# Patient Record
(demographics unavailable — no encounter records)

---

## 2024-12-12 NOTE — ASSESSMENT
[FreeTextEntry1] : Palpitations  18% PACs on holter  metoprolol contraindication in setting uncontrolled asthma  diltiazem was trialed by PCP but pt c/o symptomatic bradycardia  TTE with preserved LV function. discussed avoiding triggers- will repeat monitor in 1 year   HTN  controlled on amlodipine 5mg   Dyslipidemia  Pre-diabetes    discussed diet changes   will f/u 6 months

## 2024-12-12 NOTE — HISTORY OF PRESENT ILLNESS
[FreeTextEntry1] : Ms. Scruggs is a 66 yo female with a hx dyslipidemia, hypertension, asthma, prediabetes presenting for a routine follow up.   Initially presented due to frequent palpitations described as skipped beats.  Echo showed LVEF 63%, mild MR, mild to mod TR  Exercise stress negative for ischemia  Monitor showed frequent PACs 18.6% She was started on diltiazem (instead of metoprolol in setting asthma) but c/o symptomatic bradycardia and stopped medication   Surgical hx: hysterectomy, left shoulder repair  Tobacco use: never  Alcohol use: none  Drug use: none  Caffeine: 1 cup coffee daily  Ob hx: 3 children, 3 pregnancies (1 miscarriage, 1 twin) preeclampsia with severe features  Family hx: father MI age 40s. paternal grandfather  MI 49 Exercise: walking

## 2024-12-12 NOTE — REVIEW OF SYSTEMS
[Palpitations] : palpitations [Negative] : Heme/Lymph [SOB] : no shortness of breath [Chest Discomfort] : no chest discomfort [Lower Ext Edema] : no extremity edema [Orthopnea] : no orthopnea [PND] : no PND [Syncope] : no syncope

## 2024-12-12 NOTE — CARDIOLOGY SUMMARY
[de-identified] : Exercise stress 8/7/2024 Conclusions: 1. The ECG is negative for ischemia. 2. The patient underwent stress testing using the standard [facility] protocol. _ The patient exercised for 6 min 0 sec. _ The test was stopped due to target heart rate achieved, maximum exertion effort and patient request. _ The peak heart rate was 153 bpm; 99 % of predicted maximal heart rate for this patient. _ The patient achieved 7 METS which is consistent with average exercise capacity. 3. Stress electrocardiogram: No ischemic ST segment changes. 4. Normal heart rate response. 5. Normal blood pressure response. 6. Arrhythmias: Frequent PAC's. [de-identified] : 6/27/2024 CONCLUSIONS:   1. Left ventricular systolic function is normal with an ejection fraction of 63 % by Tolliver's method of disks.  2. Normal right ventricular cavity size and normal right ventricular systolic function.  3. Mild mitral regurgitation.  4. Mild to moderate tricuspid regurgitation.

## 2024-12-12 NOTE — HISTORY OF PRESENT ILLNESS
[FreeTextEntry1] : Ms. Scruggs is a 64 yo female with a hx dyslipidemia, hypertension, asthma, prediabetes presenting for a routine follow up.   Initially presented due to frequent palpitations described as skipped beats.  Echo showed LVEF 63%, mild MR, mild to mod TR  Exercise stress negative for ischemia  Monitor showed frequent PACs 18.6% She was started on diltiazem (instead of metoprolol in setting asthma) but c/o symptomatic bradycardia and stopped medication   Surgical hx: hysterectomy, left shoulder repair  Tobacco use: never  Alcohol use: none  Drug use: none  Caffeine: 1 cup coffee daily  Ob hx: 3 children, 3 pregnancies (1 miscarriage, 1 twin) preeclampsia with severe features  Family hx: father MI age 40s. paternal grandfather  MI 49 Exercise: walking

## 2024-12-12 NOTE — CARDIOLOGY SUMMARY
[de-identified] : Exercise stress 8/7/2024 Conclusions: 1. The ECG is negative for ischemia. 2. The patient underwent stress testing using the standard [facility] protocol. _ The patient exercised for 6 min 0 sec. _ The test was stopped due to target heart rate achieved, maximum exertion effort and patient request. _ The peak heart rate was 153 bpm; 99 % of predicted maximal heart rate for this patient. _ The patient achieved 7 METS which is consistent with average exercise capacity. 3. Stress electrocardiogram: No ischemic ST segment changes. 4. Normal heart rate response. 5. Normal blood pressure response. 6. Arrhythmias: Frequent PAC's. [de-identified] : 6/27/2024 CONCLUSIONS:   1. Left ventricular systolic function is normal with an ejection fraction of 63 % by Tolliver's method of disks.  2. Normal right ventricular cavity size and normal right ventricular systolic function.  3. Mild mitral regurgitation.  4. Mild to moderate tricuspid regurgitation.

## 2024-12-12 NOTE — PHYSICAL EXAM
[No Murmur] : no murmur [Normal] : alert and oriented, normal memory [de-identified] : irregular

## 2024-12-12 NOTE — PHYSICAL EXAM
[No Murmur] : no murmur [Normal] : alert and oriented, normal memory [de-identified] : irregular

## 2025-06-18 NOTE — CARDIOLOGY SUMMARY
[de-identified] : Exercise stress 8/7/2024 Conclusions: 1. The ECG is negative for ischemia. 2. The patient underwent stress testing using the standard [facility] protocol. _ The patient exercised for 6 min 0 sec. _ The test was stopped due to target heart rate achieved, maximum exertion effort and patient request. _ The peak heart rate was 153 bpm; 99 % of predicted maximal heart rate for this patient. _ The patient achieved 7 METS which is consistent with average exercise capacity. 3. Stress electrocardiogram: No ischemic ST segment changes. 4. Normal heart rate response. 5. Normal blood pressure response. 6. Arrhythmias: Frequent PAC's. [de-identified] : 6/27/2024 CONCLUSIONS:   1. Left ventricular systolic function is normal with an ejection fraction of 63 % by Tolliver's method of disks.  2. Normal right ventricular cavity size and normal right ventricular systolic function.  3. Mild mitral regurgitation.  4. Mild to moderate tricuspid regurgitation.

## 2025-06-18 NOTE — PHYSICAL EXAM
[No Murmur] : no murmur [Normal] : alert and oriented, normal memory [de-identified] : irregular

## 2025-06-18 NOTE — HISTORY OF PRESENT ILLNESS
[FreeTextEntry1] : Ms. Scruggs is a 64 yo female with a hx dyslipidemia, hypertension, asthma, prediabetes, frequent PACs presenting for a routine follow up.   Initially presented due to frequent palpitations described as skipped beats.  Echo showed LVEF 63%, mild MR, mild to mod TR  Exercise stress negative for ischemia  Monitor showed frequent PACs 18.6% She was started on diltiazem (instead of metoprolol in setting asthma) but c/o symptomatic bradycardia and stopped medication    Surgical hx: hysterectomy, left shoulder repair  Tobacco use: never  Alcohol use: none  Drug use: none  Caffeine: 1 cup coffee daily  Ob hx: 3 children, 3 pregnancies (1 miscarriage, 1 twin) preeclampsia with severe features  Family hx: father MI age 40s. paternal grandfather  MI 49 Exercise: walking

## 2025-06-18 NOTE — ASSESSMENT
[FreeTextEntry1] : Palpitations  18% PACs on holter  metoprolol contraindication in setting uncontrolled asthma  diltiazem was trialed by PCP but pt c/o symptomatic bradycardia  TTE with preserved LV function. discussed avoiding triggers- will repeat monitor in 1 year    118/70 on repeat   HTN  controlled on amlodipine 5mg   Dyslipidemia  Pre-diabetes    discussed diet changes   will f/u 6 months